# Patient Record
Sex: FEMALE | Race: WHITE | Employment: UNEMPLOYED | URBAN - METROPOLITAN AREA
[De-identification: names, ages, dates, MRNs, and addresses within clinical notes are randomized per-mention and may not be internally consistent; named-entity substitution may affect disease eponyms.]

---

## 2017-02-04 ENCOUNTER — HOSPITAL ENCOUNTER (EMERGENCY)
Age: 47
Discharge: HOME | End: 2017-02-04
Attending: FAMILY MEDICINE

## 2017-02-04 VITALS
BODY MASS INDEX: 32.07 KG/M2 | HEIGHT: 63 IN | TEMPERATURE: 98.2 F | DIASTOLIC BLOOD PRESSURE: 78 MMHG | OXYGEN SATURATION: 97 % | RESPIRATION RATE: 18 BRPM | WEIGHT: 181 LBS | HEART RATE: 84 BPM | SYSTOLIC BLOOD PRESSURE: 122 MMHG

## 2017-02-04 DIAGNOSIS — J01.90 ACUTE SINUSITIS, RECURRENCE NOT SPECIFIED, UNSPECIFIED LOCATION: Primary | ICD-10-CM

## 2017-02-04 RX ORDER — PROMETHAZINE HYDROCHLORIDE AND DEXTROMETHORPHAN HYDROBROMIDE 6.25; 15 MG/5ML; MG/5ML
5 SYRUP ORAL
Qty: 100 ML | Refills: 0 | Status: SHIPPED | OUTPATIENT
Start: 2017-02-04 | End: 2017-07-31

## 2017-02-04 RX ORDER — AMOXICILLIN 875 MG/1
875 TABLET, FILM COATED ORAL 2 TIMES DAILY
Qty: 20 TAB | Refills: 0 | Status: SHIPPED | OUTPATIENT
Start: 2017-02-04 | End: 2017-02-14

## 2017-02-04 RX ORDER — BENZONATATE 200 MG/1
200 CAPSULE ORAL
Qty: 21 CAP | Refills: 0 | Status: SHIPPED | OUTPATIENT
Start: 2017-02-04 | End: 2017-02-11

## 2017-02-04 NOTE — DISCHARGE INSTRUCTIONS
Sinusitis: Care Instructions  Your Care Instructions    Sinusitis is an infection of the lining of the sinus cavities in your head. Sinusitis often follows a cold. It causes pain and pressure in your head and face. In most cases, sinusitis gets better on its own in 1 to 2 weeks. But some mild symptoms may last for several weeks. Sometimes antibiotics are needed. Follow-up care is a key part of your treatment and safety. Be sure to make and go to all appointments, and call your doctor if you are having problems. It's also a good idea to know your test results and keep a list of the medicines you take. How can you care for yourself at home? · Take an over-the-counter pain medicine, such as acetaminophen (Tylenol), ibuprofen (Advil, Motrin), or naproxen (Aleve). Read and follow all instructions on the label. · If the doctor prescribed antibiotics, take them as directed. Do not stop taking them just because you feel better. You need to take the full course of antibiotics. · Be careful when taking over-the-counter cold or flu medicines and Tylenol at the same time. Many of these medicines have acetaminophen, which is Tylenol. Read the labels to make sure that you are not taking more than the recommended dose. Too much acetaminophen (Tylenol) can be harmful. · Breathe warm, moist air from a steamy shower, a hot bath, or a sink filled with hot water. Avoid cold, dry air. Using a humidifier in your home may help. Follow the directions for cleaning the machine. · Use saline (saltwater) nasal washes to help keep your nasal passages open and wash out mucus and bacteria. You can buy saline nose drops at a grocery store or drugstore. Or you can make your own at home by adding 1 teaspoon of salt and 1 teaspoon of baking soda to 2 cups of distilled water. If you make your own, fill a bulb syringe with the solution, insert the tip into your nostril, and squeeze gently. [place] Bees your nose.   · Put a hot, wet towel or a warm gel pack on your face 3 or 4 times a day for 5 to 10 minutes each time. · Try a decongestant nasal spray like oxymetazoline (Afrin). Do not use it for more than 3 days in a row. Using it for more than 3 days can make your congestion worse. When should you call for help? Call your doctor now or seek immediate medical care if:  · You have new or worse swelling or redness in your face or around your eyes. · You have a new or higher fever. Watch closely for changes in your health, and be sure to contact your doctor if:  · You have new or worse facial pain. · The mucus from your nose becomes thicker (like pus) or has new blood in it. · You are not getting better as expected. Where can you learn more? Go to http://caro-mary.info/. Enter R325 in the search box to learn more about \"Sinusitis: Care Instructions. \"  Current as of: July 29, 2016  Content Version: 11.1  © 20061043-9619 [employer]. Care instructions adapted under license by [email or web address] (which disclaims liability or warranty for this information). If you have questions about a medical condition or this instruction, always ask your healthcare professional. [place] [address] any warranty or liability for your use of this information.

## 2017-02-04 NOTE — UC PROVIDER NOTE
Patient is a 51 y.o. female presenting with sinus pain. The history is provided by the patient. Sinus Pain    The current episode started more than 1 week ago. The problem has been gradually worsening. There has been no fever. Associated symptoms include congestion, hoarse voice, sinus pressure and headaches. She has tried acetaminophen, decongestants and nasal steroids for the symptoms. The treatment provided mild relief. Past Medical History   Diagnosis Date    Asthma      \"mild\"    Hypertension     Plantar fasciitis of left foot 8/10/2015        Past Surgical History   Procedure Laterality Date    Hx gyn       c section x's 2    Hx gyn       partial hysterectomy    Hx urological       \"stones crushed\"    Pr breast surgery procedure unlisted       breast reduction    Hx orthopaedic Right      foot surgery    Hx cataract removal           History reviewed. No pertinent family history. Social History     Social History    Marital status:      Spouse name: N/A    Number of children: N/A    Years of education: N/A     Occupational History    Not on file. Social History Main Topics    Smoking status: Former Smoker    Smokeless tobacco: Not on file    Alcohol use Yes      Comment: socially    Drug use: No    Sexual activity: Not on file     Other Topics Concern    Not on file     Social History Narrative                ALLERGIES: Latex; Chlorhexidine towelette; and Sulfa (sulfonamide antibiotics)    Review of Systems   HENT: Positive for congestion, hoarse voice and sinus pressure. Neurological: Positive for headaches. Vitals:    02/04/17 1230   BP: 122/78   Pulse: 84   Resp: 18   Temp: 98.2 °F (36.8 °C)   SpO2: 97%   Weight: 82.1 kg (181 lb)   Height: 5' 3\" (1.6 m)       Physical Exam   Constitutional: No distress.    HENT:   Right Ear: Tympanic membrane and ear canal normal.   Left Ear: Tympanic membrane and ear canal normal.   Nose: Right sinus exhibits maxillary sinus tenderness and frontal sinus tenderness. Left sinus exhibits maxillary sinus tenderness and frontal sinus tenderness. Mouth/Throat: No oropharyngeal exudate, posterior oropharyngeal edema or posterior oropharyngeal erythema. Eyes: Conjunctivae are normal. Right eye exhibits no discharge. Left eye exhibits no discharge. Neck: Neck supple. Pulmonary/Chest: Effort normal and breath sounds normal. No respiratory distress. She has no wheezes. She has no rales. Lymphadenopathy:     She has no cervical adenopathy. Skin: No rash noted. Nursing note and vitals reviewed. MDM     Differential Diagnosis; Clinical Impression; Plan:     CLINICAL IMPRESSION:  Acute sinusitis, recurrence not specified, unspecified location  (primary encounter diagnosis)      DDX    Plan:Fluids/ gargles  Claritin/ allegra   Tylenol cold-sinus - max strength 1-2 tab 4 times/ day    with Advil as needed    Amoxicillin  And saline sinus rinse     Amount and/or Complexity of Data Reviewed:    Review and summarize past medical records:  Yes  Risk of Significant Complications, Morbidity, and/or Mortality:   Presenting problems: Moderate  Management options:   Moderate  Progress:   Patient progress:  Stable      Procedures

## 2017-07-31 ENCOUNTER — HOSPITAL ENCOUNTER (EMERGENCY)
Age: 47
Discharge: HOME | End: 2017-07-31
Attending: FAMILY MEDICINE

## 2017-07-31 VITALS
BODY MASS INDEX: 32.05 KG/M2 | SYSTOLIC BLOOD PRESSURE: 128 MMHG | WEIGHT: 180.9 LBS | OXYGEN SATURATION: 97 % | TEMPERATURE: 97.9 F | DIASTOLIC BLOOD PRESSURE: 76 MMHG | HEIGHT: 63 IN | HEART RATE: 87 BPM | RESPIRATION RATE: 18 BRPM

## 2017-07-31 DIAGNOSIS — L30.9 DERMATITIS: Primary | ICD-10-CM

## 2017-07-31 RX ORDER — PREDNISONE 10 MG/1
TABLET ORAL
Qty: 21 TAB | Refills: 0 | Status: SHIPPED | OUTPATIENT
Start: 2017-07-31

## 2017-07-31 RX ORDER — ESTRADIOL 10 UG/1
10 INSERT VAGINAL DAILY
COMMUNITY

## 2017-07-31 NOTE — UC PROVIDER NOTE
Patient is a 51 y.o. female presenting with rash. The history is provided by the patient. Rash    This is a recurrent (hx of eczema) problem. Episode onset: 1 week ago. The problem has been gradually worsening. The problem is associated with an unknown factor. There has been no fever. The rash is present on the chest, right arm and left arm. The patient is experiencing no pain. Associated symptoms include itching. She has tried steroid cream for the symptoms. The treatment provided no relief. Past Medical History:   Diagnosis Date    Asthma     \"mild\"    Hypertension     Plantar fasciitis of left foot 8/10/2015        Past Surgical History:   Procedure Laterality Date    BREAST SURGERY PROCEDURE UNLISTED      breast reduction    HX CATARACT REMOVAL      HX GYN      c section x's 2    HX GYN      partial hysterectomy    HX ORTHOPAEDIC Right     foot surgery    HX UROLOGICAL      \"stones crushed\"         History reviewed. No pertinent family history. Social History     Social History    Marital status:      Spouse name: N/A    Number of children: N/A    Years of education: N/A     Occupational History    Not on file. Social History Main Topics    Smoking status: Former Smoker    Smokeless tobacco: Not on file    Alcohol use Yes      Comment: socially    Drug use: No    Sexual activity: Not on file     Other Topics Concern    Not on file     Social History Narrative                ALLERGIES: Latex; Chlorhexidine towelette; and Sulfa (sulfonamide antibiotics)    Review of Systems   Constitutional: Negative for chills and fever. Respiratory: Negative for shortness of breath and wheezing. Cardiovascular: Negative for chest pain and palpitations. Gastrointestinal: Negative for nausea and vomiting. Musculoskeletal: Negative for myalgias. Skin: Positive for itching and rash. Neurological: Negative for dizziness and headaches.        Vitals:    07/31/17 1029   BP: 128/76 Pulse: 87   Resp: 18   Temp: 97.9 °F (36.6 °C)   SpO2: 97%   Weight: 82.1 kg (180 lb 14.4 oz)   Height: 5' 3\" (1.6 m)       Physical Exam   Constitutional: She appears well-developed and well-nourished. No distress. Neurological: She is alert. Skin: She is not diaphoretic. Chest; BUE: multiple scatted erythematous papular lesions   Psychiatric: She has a normal mood and affect. Her behavior is normal. Judgment and thought content normal.   Nursing note and vitals reviewed. MDM     Differential Diagnosis; Clinical Impression; Plan:     CLINICAL IMPRESSION:  Dermatitis  (primary encounter diagnosis)    Plan:  1. pred fletcher  2. PCP if no improvement  Risk of Significant Complications, Morbidity, and/or Mortality:   Presenting problems: Moderate  Management options:   Moderate  Progress:   Patient progress:  Stable      Procedures

## 2017-07-31 NOTE — DISCHARGE INSTRUCTIONS

## 2022-12-16 ENCOUNTER — TRANSCRIBE ORDER (OUTPATIENT)
Dept: SCHEDULING | Age: 52
End: 2022-12-16

## 2022-12-16 DIAGNOSIS — M54.50 LUMBAR SPINE PAIN: Primary | ICD-10-CM

## 2022-12-16 DIAGNOSIS — S32.020A COMPRESSION FRACTURE OF L2 LUMBAR VERTEBRA, CLOSED, INITIAL ENCOUNTER (HCC): ICD-10-CM

## 2022-12-30 ENCOUNTER — HOSPITAL ENCOUNTER (OUTPATIENT)
Dept: MRI IMAGING | Age: 52
Discharge: HOME | End: 2022-12-30
Attending: ORTHOPAEDIC SURGERY
Payer: OTHER GOVERNMENT

## 2022-12-30 DIAGNOSIS — S32.020A COMPRESSION FRACTURE OF L2 LUMBAR VERTEBRA, CLOSED, INITIAL ENCOUNTER (HCC): ICD-10-CM

## 2022-12-30 DIAGNOSIS — M54.50 LUMBAR SPINE PAIN: ICD-10-CM

## 2022-12-30 PROCEDURE — 72148 MRI LUMBAR SPINE W/O DYE: CPT

## 2023-02-16 ENCOUNTER — TRANSCRIBE ORDER (OUTPATIENT)
Dept: SCHEDULING | Age: 53
End: 2023-02-16

## 2023-02-16 DIAGNOSIS — M80.88XD OTHER OSTEOPOROSIS WITH CURRENT PATHOLOGICAL FRACTURE, VERTEBRA(E), SUBSEQUENT ENCOUNTER FOR FRACTURE WITH ROUTINE HEALING: Primary | ICD-10-CM

## 2023-04-22 DIAGNOSIS — M80.88XD OTHER OSTEOPOROSIS WITH CURRENT PATHOLOGICAL FRACTURE, VERTEBRA(E), SUBSEQUENT ENCOUNTER FOR FRACTURE WITH ROUTINE HEALING: Primary | ICD-10-CM
